# Patient Record
Sex: FEMALE | Race: WHITE | NOT HISPANIC OR LATINO | Employment: FULL TIME | URBAN - METROPOLITAN AREA
[De-identification: names, ages, dates, MRNs, and addresses within clinical notes are randomized per-mention and may not be internally consistent; named-entity substitution may affect disease eponyms.]

---

## 2014-12-31 LAB — HUMAN PAPILLOMAVIRUS (HPV): NORMAL

## 2018-08-17 LAB — HIV AG/AB 4TH GEN: NORMAL

## 2019-12-13 RX ORDER — BUPROPION HYDROCHLORIDE 150 MG/1
TABLET ORAL
Qty: 30 TABLET | OUTPATIENT
Start: 2019-12-13

## 2019-12-16 ENCOUNTER — OFFICE VISIT (OUTPATIENT)
Dept: FAMILY MEDICINE | Facility: CLINIC | Age: 54
End: 2019-12-16
Payer: COMMERCIAL

## 2019-12-16 VITALS
TEMPERATURE: 100 F | HEIGHT: 63 IN | HEART RATE: 79 BPM | SYSTOLIC BLOOD PRESSURE: 138 MMHG | WEIGHT: 177 LBS | BODY MASS INDEX: 31.36 KG/M2 | DIASTOLIC BLOOD PRESSURE: 88 MMHG

## 2019-12-16 DIAGNOSIS — R53.82 CHRONIC FATIGUE: ICD-10-CM

## 2019-12-16 DIAGNOSIS — E66.09 CLASS 1 OBESITY DUE TO EXCESS CALORIES WITH SERIOUS COMORBIDITY AND BODY MASS INDEX (BMI) OF 31.0 TO 31.9 IN ADULT: ICD-10-CM

## 2019-12-16 DIAGNOSIS — J30.1 SEASONAL ALLERGIC RHINITIS DUE TO POLLEN: ICD-10-CM

## 2019-12-16 DIAGNOSIS — F41.1 ANXIETY, GENERALIZED: ICD-10-CM

## 2019-12-16 DIAGNOSIS — I10 ESSENTIAL HYPERTENSION: ICD-10-CM

## 2019-12-16 DIAGNOSIS — F33.2 SEVERE EPISODE OF RECURRENT MAJOR DEPRESSIVE DISORDER, WITHOUT PSYCHOTIC FEATURES: Primary | ICD-10-CM

## 2019-12-16 DIAGNOSIS — N95.1 PERIMENOPAUSAL: ICD-10-CM

## 2019-12-16 DIAGNOSIS — H60.311 ACUTE DIFFUSE OTITIS EXTERNA OF RIGHT EAR: ICD-10-CM

## 2019-12-16 PROBLEM — F33.9 EPISODE OF RECURRENT MAJOR DEPRESSIVE DISORDER: Chronic | Status: ACTIVE | Noted: 2019-12-16

## 2019-12-16 PROBLEM — F33.9 EPISODE OF RECURRENT MAJOR DEPRESSIVE DISORDER: Status: ACTIVE | Noted: 2019-12-16

## 2019-12-16 PROCEDURE — 99999 PR PBB SHADOW E&M-EST. PATIENT-LVL IV: CPT | Mod: PBBFAC,,, | Performed by: INTERNAL MEDICINE

## 2019-12-16 PROCEDURE — 96372 PR INJECTION,THERAP/PROPH/DIAG2ST, IM OR SUBCUT: ICD-10-PCS | Mod: S$GLB,,, | Performed by: INTERNAL MEDICINE

## 2019-12-16 PROCEDURE — 96372 THER/PROPH/DIAG INJ SC/IM: CPT | Mod: S$GLB,,, | Performed by: INTERNAL MEDICINE

## 2019-12-16 PROCEDURE — 99214 OFFICE O/P EST MOD 30 MIN: CPT | Mod: 25,S$GLB,, | Performed by: INTERNAL MEDICINE

## 2019-12-16 PROCEDURE — 99999 PR PBB SHADOW E&M-EST. PATIENT-LVL IV: ICD-10-PCS | Mod: PBBFAC,,, | Performed by: INTERNAL MEDICINE

## 2019-12-16 PROCEDURE — 99214 PR OFFICE/OUTPT VISIT, EST, LEVL IV, 30-39 MIN: ICD-10-PCS | Mod: 25,S$GLB,, | Performed by: INTERNAL MEDICINE

## 2019-12-16 RX ORDER — BUSPIRONE HYDROCHLORIDE 5 MG/1
5 TABLET ORAL SEE ADMIN INSTRUCTIONS
Qty: 60 TABLET | Refills: 0 | Status: SHIPPED | OUTPATIENT
Start: 2019-12-16 | End: 2020-12-15

## 2019-12-16 RX ORDER — METHYLPREDNISOLONE 4 MG/1
TABLET ORAL
Qty: 21 TABLET | Refills: 0 | Status: SHIPPED | OUTPATIENT
Start: 2019-12-17 | End: 2020-06-09

## 2019-12-16 RX ORDER — DESVENLAFAXINE SUCCINATE 50 MG/1
50 TABLET, EXTENDED RELEASE ORAL DAILY
Qty: 30 TABLET | Refills: 1 | Status: SHIPPED | OUTPATIENT
Start: 2019-12-16 | End: 2020-01-29

## 2019-12-16 RX ORDER — AMLODIPINE BESYLATE 5 MG/1
5 TABLET ORAL DAILY
COMMUNITY
End: 2020-01-06

## 2019-12-16 RX ORDER — BUPROPION HYDROCHLORIDE 150 MG/1
150 TABLET ORAL SEE ADMIN INSTRUCTIONS
COMMUNITY
End: 2020-09-08

## 2019-12-16 RX ORDER — METHYLPREDNISOLONE ACETATE 40 MG/ML
40 INJECTION, SUSPENSION INTRA-ARTICULAR; INTRALESIONAL; INTRAMUSCULAR; SOFT TISSUE
Status: COMPLETED | OUTPATIENT
Start: 2019-12-16 | End: 2019-12-16

## 2019-12-16 RX ORDER — HYDROCHLOROTHIAZIDE 25 MG/1
25 TABLET ORAL DAILY
COMMUNITY
End: 2020-05-19

## 2019-12-16 RX ADMIN — METHYLPREDNISOLONE ACETATE 40 MG: 40 INJECTION, SUSPENSION INTRA-ARTICULAR; INTRALESIONAL; INTRAMUSCULAR; SOFT TISSUE at 01:12

## 2019-12-16 NOTE — PATIENT INSTRUCTIONS
1. Get labs fasting  2. Start wellbutrin every other day for one week then stop  3. Start pristiq when wellbutrin ends.   4. Start buspar 5 mg this evening. Take for 5 days. If tolerating can increase to twice daily (only for anxiety)

## 2019-12-16 NOTE — PROGRESS NOTES
Subjective:      Patient ID: Marcos Patel is a 54 y.o. female.    Chief Complaint: Anxiety; Depression; and Allergies    HPI     54F here for follow up of depression, anxiety, and allergies. Last seen by me at [facility] on 10/7/19.    Anxiety, MDD  -10/7/19: PHQ2 score 3, PHQ9 score 9.  -Today: phq9 score 24  - lexapro 20 mg daily changed to wellbutrin  mg daily at last visit. Her father had recently passed away and she was having weight gain.   -prior treatments also included zoloft and celexa.   -she did well with the medication for about 1 month, however then began to have a lot of life stressors including unexpected medical expenses from her daughter injuring her knee during a soccer game and requiring MRI imaging and physical therapy, her daughter undergoing wisdom teeth extraction and tonsillectomy/adenoidectomy, and her ex- not pain child support or assisting with medical bills.  She is overwhelmed with becoming behind with her bills and stress in general.  Her  has been occurring with her over stressors as well.  She has had increased stress at work with the death of a co-worker and 1 of her close friends and coworkers being diagnosed with cancer.  She no longer feels that the medication is working and is having trouble focusing, feeling very fragile and foggy, stressed with constant clenching of her jaws and resulting stress headaches.  She feels emotional at all times and spread to thin.  She feels as though she is not able to complete tasks due to being unable to organize and prioritize.  She is still grieving over the death of her father and feeling overwhelmed.  Her energy level is very low.  No suicidal ideation.  We discussed weaning off the Wellbutrin and starting Pristiq as she feels as though she may be experiencing hormonal symptoms as well.  Support given.    Depression Patient Health Questionnaire 12/16/2019   Over the last two weeks how often have you been  bothered by little interest or pleasure in doing things 3   Over the last two weeks how often have you been bothered by feeling down, depressed or hopeless 3   PHQ-2 Total Score 6   Over the last two weeks how often have you been bothered by trouble falling or staying asleep, or sleeping too much 3   Over the last two weeks how often have you been bothered by feeling tired or having little energy 3   Over the last two weeks how often have you been bothered by a poor appetite or overeating 3   Over the last two weeks how often have you been bothered by feeling bad about yourself - or that you are a failure or have let yourself or your family down 3   Over the last two weeks how often have you been bothered by trouble concentrating on things, such as reading the newspaper or watching television 3   Over the last two weeks how often have you been bothered by moving or speaking so slowly that other people could have noticed. Or the opposite - being so fidgety or restless that you have been moving around a lot more than usual. 3   Over the last two weeks how often have you been bothered by thoughts that you would be better off dead, or of hurting yourself 0   If you checked off any problems, how difficult have these problems made it for you to do your work, take care of things at home or get along with other people? Extremely difficult   Total Score 24     Obesity:   -163 in 9/2018 --> 177 on 10/7 & today, 12/16/19  -not eating well. trying to drink more water. Knows that her diet could improve and is motivated to start eating better. She has not been going to the gym due to low mood.     HTN:  -compliant with amlodipine and hctz   -does not limit salt. BP controlled (elevated on first check, but resolved)  -does have trace BLE swelling     Right ear pain & allergies  -symptoms started 1.5 weeks ago with pain in her right ear that initially was piercing in nature.  She went to the walk-in clinic and was prescribed  Ciprodex drops, which she has not been as consistent with, but over the last few days the pain has spread bilaterally to an aching sensation with sinus pressure and congestion. She has not been taking anything over-the-counter.  She has not had any fevers or chills, but overall feels very fatigued and potentially low-grade fevers, but difficult to tell with her low mood.  No sick contacts.  Flu shot is up-to-date.  She is not a smoker.    We reviewed her needed laboratory data together.  She was given lab slips as she receives these at [facility].    Review of patient's allergies indicates:  No Known Allergies    Current Outpatient Medications:     amLODIPine (NORVASC) 5 MG tablet, Take 5 mg by mouth once daily., Disp: , Rfl:     buPROPion (WELLBUTRIN XL) 150 MG TB24 tablet, Take 150 mg by mouth As instructed. Every other day x 1 week then stop, Disp: , Rfl:     hydroCHLOROthiazide (HYDRODIURIL) 25 MG tablet, Take 25 mg by mouth once daily., Disp: , Rfl:     busPIRone (BUSPAR) 5 MG Tab, Take 1 tablet (5 mg total) by mouth As instructed. Take 1 pill in the evening for 5 days then can increase to twice daily if tolerating, Disp: 60 tablet, Rfl: 0    desvenlafaxine succinate (PRISTIQ) 50 MG Tb24, Take 1 tablet (50 mg total) by mouth once daily., Disp: 30 tablet, Rfl: 1    [START ON 12/17/2019] methylPREDNISolone (MEDROL DOSEPACK) 4 mg tablet, follow package directions, Disp: 21 tablet, Rfl: 0  No current facility-administered medications for this visit.     No past medical history on file.  No past surgical history on file.  No family history on file.  Social History     Socioeconomic History    Marital status:      Spouse name: Not on file    Number of children: Not on file    Years of education: Not on file    Highest education level: Not on file   Occupational History    Not on file   Social Needs    Financial resource strain: Not on file    Food insecurity:     Worry: Not on file     Inability:  Not on file    Transportation needs:     Medical: Not on file     Non-medical: Not on file   Tobacco Use    Smoking status: Not on file   Substance and Sexual Activity    Alcohol use: Not on file    Drug use: Not on file    Sexual activity: Not on file   Lifestyle    Physical activity:     Days per week: Not on file     Minutes per session: Not on file    Stress: Not on file   Relationships    Social connections:     Talks on phone: Not on file     Gets together: Not on file     Attends Buddhism service: Not on file     Active member of club or organization: Not on file     Attends meetings of clubs or organizations: Not on file     Relationship status: Not on file   Other Topics Concern    Not on file   Social History Narrative    Not on file      Review of Systems   Constitutional: Positive for activity change, fatigue and fever (low grade). Negative for chills.   HENT: Positive for ear pain, postnasal drip, rhinorrhea, sinus pressure and sinus pain. Negative for congestion, sneezing and sore throat.    Eyes: Positive for itching. Negative for visual disturbance.   Respiratory: Negative for cough, shortness of breath and wheezing.    Cardiovascular: Negative.  Negative for chest pain and palpitations.   Gastrointestinal: Negative for abdominal pain and nausea.   Endocrine: Negative for cold intolerance and heat intolerance.   Genitourinary: Negative.  Negative for dysuria.   Musculoskeletal: Negative for back pain and myalgias.   Skin: Negative.  Negative for rash.   Allergic/Immunologic: Positive for environmental allergies.   Neurological: Negative for dizziness, syncope and headaches.   Hematological: Positive for adenopathy. Does not bruise/bleed easily.   Psychiatric/Behavioral: Positive for decreased concentration, dysphoric mood and sleep disturbance. Negative for suicidal ideas. The patient is nervous/anxious.          Objective:     Body mass index is 31.35 kg/m².  /88   Pulse 79   Temp  "99.7 °F (37.6 °C) (Oral)   Ht 5' 3" (1.6 m)   Wt 80.3 kg (177 lb)   BMI 31.35 kg/m²       Physical Exam   Constitutional: She is oriented to person, place, and time. She appears well-developed and well-nourished. No distress.   HENT:   Head: Normocephalic.   Mouth/Throat: Oropharynx is clear and moist.   Bilateral TM without effusion or erythema. Right ear canal painful with otoscope insertion. Hearing intact bilaterally. Boggy nasal turbinates. Sinus tenderness diffusely.    Eyes: Conjunctivae are normal. Right eye exhibits no discharge. Left eye exhibits no discharge.   Cardiovascular: Normal rate, regular rhythm, normal heart sounds and intact distal pulses.   No murmur heard.  Pulmonary/Chest: Effort normal and breath sounds normal.   Abdominal: Soft. Bowel sounds are normal.   Musculoskeletal: She exhibits no tenderness.   Lymphadenopathy:     She has no cervical adenopathy.   Neurological: She is alert and oriented to person, place, and time. No sensory deficit.   Skin: Skin is warm and dry. Capillary refill takes 2 to 3 seconds. She is not diaphoretic.   Psychiatric:   Crying, depressed, anxious   Nursing note and vitals reviewed.      No results found for any previous visit.     No results found in the last 24 hours.     Labs, imaging, records reviewed in care everywhere.     Assessment:     Encounter Diagnoses   Name Primary?    Severe episode of recurrent major depressive disorder, without psychotic features Yes    Essential hypertension     Class 1 obesity due to excess calories with serious comorbidity and body mass index (BMI) of 31.0 to 31.9 in adult     Perimenopausal     Chronic fatigue     Acute diffuse otitis externa of right ear     Seasonal allergic rhinitis due to pollen     Anxiety, generalized         Plan:     #Essential HTN, controlled  ACC goal <130/80  -Continue home BP readings   -Continue HCTZ 25 mg daily & Norvasc 5 mg daily   -Counseled on low salt diet.  -Encouraged walking " 30 minutes a day   -6/3/19: microalbumin, tsh, cmp, mg: normal    #Anxiety, Depression, not at goal  -Previously on Zoloft and Celexa during divorce. Lexparo 20 mg 8/2018-8/2019. Changed to Welbutrin  mg daily  -Mood now exacerbated with life stressors and likely perimenopausal hormone shifts.   -Wean wellbutrin  mg to every other day for 1 week then change to pristiq (help with energy and vasomotor symptoms)  -Start buspar 5 mg nightly with increase to BID as tolerated for anxiety.  -rule out any metabolic derangements.   -No SI  -Support given   Depression Patient Health Questionnaire 12/16/2019   Over the last two weeks how often have you been bothered by little interest or pleasure in doing things 3   Over the last two weeks how often have you been bothered by feeling down, depressed or hopeless 3   PHQ-2 Total Score 6   Over the last two weeks how often have you been bothered by trouble falling or staying asleep, or sleeping too much 3   Over the last two weeks how often have you been bothered by feeling tired or having little energy 3   Over the last two weeks how often have you been bothered by a poor appetite or overeating 3   Over the last two weeks how often have you been bothered by feeling bad about yourself - or that you are a failure or have let yourself or your family down 3   Over the last two weeks how often have you been bothered by trouble concentrating on things, such as reading the newspaper or watching television 3   Over the last two weeks how often have you been bothered by moving or speaking so slowly that other people could have noticed. Or the opposite - being so fidgety or restless that you have been moving around a lot more than usual. 3   Over the last two weeks how often have you been bothered by thoughts that you would be better off dead, or of hurting yourself 0   If you checked off any problems, how difficult have these problems made it for you to do your work, take care  of things at home or get along with other people? Extremely difficult   Total Score 24     #Obesity- BMI 31.35  -8/2018: lipid panel: chol 157, hdl 53, ldl 88, tri 69, ha1c 5.1%  -Discussed weight loss and exercise in helping mood     #Perimenopausal   -follows with dr. faith  -6/3/19: vit d 29    #Right otitis externa & allergic rhinitis  -no s/s infection, but advised to continue ciprodex for right otitis externa  -depo medrol today. Start medrol dose pack tomorrow  -rest, hydration     #Preventative Care  -Mammogram 2/2019. BIRADS 1. Repeat in 1 year.   -HIV negative 8/2018  -Pap smear: 2019 with Dr. Faith. Get records.  -Colonoscopy 2012- Dr. Yang. Normal. Repeat in 10 years. Get records  -Influenza UTD (given at [facility])  -TDAP 6/31/19   -Shingrix rx given     Has [facility]. Follow up in 3-6 months. Labs ordered. Awaiting chart to merge with [facility].     Carmenza Jamil M.D.    Orders Placed This Encounter   Procedures    CBC auto differential    Comprehensive metabolic panel    TSH    Vitamin D    Lipid panel    Microalbumin/creatinine urine ratio    Magnesium    Iron and TIBC    Ferritin    Hemoglobin A1c      Medications Ordered This Encounter   Medications    busPIRone (BUSPAR) 5 MG Tab     Sig: Take 1 tablet (5 mg total) by mouth As instructed. Take 1 pill in the evening for 5 days then can increase to twice daily if tolerating     Dispense:  60 tablet     Refill:  0    desvenlafaxine succinate (PRISTIQ) 50 MG Tb24     Sig: Take 1 tablet (50 mg total) by mouth once daily.     Dispense:  30 tablet     Refill:  1    methylPREDNISolone (MEDROL DOSEPACK) 4 mg tablet     Sig: follow package directions     Dispense:  21 tablet     Refill:  0    methylPREDNISolone acetate injection 40 mg

## 2020-01-06 RX ORDER — AMLODIPINE BESYLATE 5 MG/1
TABLET ORAL
Qty: 90 TABLET | Refills: 0 | Status: SHIPPED | OUTPATIENT
Start: 2020-01-06 | End: 2020-05-19

## 2020-01-10 DIAGNOSIS — Z12.39 BREAST CANCER SCREENING: ICD-10-CM

## 2020-01-28 DIAGNOSIS — F41.1 ANXIETY, GENERALIZED: ICD-10-CM

## 2020-01-28 DIAGNOSIS — F33.2 SEVERE EPISODE OF RECURRENT MAJOR DEPRESSIVE DISORDER, WITHOUT PSYCHOTIC FEATURES: ICD-10-CM

## 2020-01-28 NOTE — TELEPHONE ENCOUNTER
Can you find out how she is doing with the pristiq? I don't think she has linked her mycharts yet.

## 2020-01-29 RX ORDER — DESVENLAFAXINE SUCCINATE 50 MG/1
TABLET, EXTENDED RELEASE ORAL
Qty: 90 TABLET | Refills: 0 | Status: SHIPPED | OUTPATIENT
Start: 2020-01-29 | End: 2020-06-09

## 2020-01-31 ENCOUNTER — TELEPHONE (OUTPATIENT)
Dept: FAMILY MEDICINE | Facility: CLINIC | Age: 55
End: 2020-01-31

## 2020-01-31 NOTE — TELEPHONE ENCOUNTER
Glomerular Filtration Rate  Order: [id number]  Status: Final result   Visible to patient: Yes ([place]) Dx: Essential hypertension, malignant; Sy...   Ref Range & Units 08:57 8mo ago   GFR Non  >59 mL/min >60 67 R   GFR  >59 mL/min >60 78 R   Comment:              STAGES OF CHRONIC KIDNEY DISEASE  STAGE          DESCRIPTION           GFR(mL/min/1.73 m2)  3         Moderate decrease GFR            30-59  4           Severe decrease GFR            15-29  5              Kidney Failure         <15 (or dialysis)  Chronic kidney disease is defined as either kidney damage  or GFR <60mL/min/1.73 m2 for >=3 months. Kidney damage is  defined as pathologic abnormalities or markers of damage,  including abnormalities in blood or urine tests or imaging  studies.   Resulting Agency  [employer]         Specimen Collected: 01/31/20 08:57 Last Resulted: 01/31/20 10:48           TSH  Order: [id number]  Status: Final result   Visible to patient: Yes ([place]) Dx: Essential hypertension, malignant; Sy...   Ref Range & Units 08:57 3yr ago   TSH 0.34 - 5.60 u[IU]/mL 1.35 1.08 R, CM   Resulting Agency  [employer] [facility]         Specimen Collected: 01/31/20 08:57 Last Resulted: 01/31/20 11:04           Microalbumin/Creatinine Ratio, Random Urine  Order: [id number]  Status: Final result   Visible to patient: Yes ([place]) Dx: Essential hypertension, malignant; Sy...   Ref Range & Units 08:40 8mo ago   Micoralbumin, Urine  see below 0.3 CM   Comment: MICROALBUMIN, RANDOM URINE   Creatinine, Urine mg/dL 46 160 R   Comment: No reference range established for this test.   Albumin/Creatinine Ratio, UR mg/g[creat] see below    Comment: UNABLE TO CALCULATE RESULT BECAUSE ONE OF THE REQUIRED TEST  RESULTS WAS OUTSIDE OF INSTRUMENT RANGE.  [employer] Definition of Microalbuminuria:  Category                  Spot Collection (mg/g  creatinine)  ------------------------------------------------------------  Normal                             <30  Microalbuminuria                     Macro (clinical)-albuminuria       > or = 300   Resulting Agency  [facility]         Specimen Collected: 01/31/20 08:40 Last Resulted: 01/31/20 09:32              Vitamin D, 25-Hydroxy Total  Order: [id number]  Status: Final result   Visible to patient: Yes ([place]) Dx: Essential hypertension, malignant; Sy...   Ref Range & Units 08:57 8mo ago   Vitamin D, 25-Hydroxy Total 30.0 - 100.0 ng/mL 49.9 29 R, CM   Comment: Vitamin D Status:  Deficient           <20 ng/mL  Insufficient        20-<30 ng/mL  Sufficient           ng/mL  Upper Safety limit  >100 ng/mL   Resulting Agency  [facility]         Specimen Collected: 01/31/20 08:57 Last Resulted: 01/31/20 11:04 Order Details View Encounter Lab and Collection Details Routing Result History      CM=Additional comments  R=Reference range differs from displayed range             Ferritin  Order: [id number]  Status: Final result   Visible to patient: Yes ([place]) Dx: Essential hypertension, malignant; Sy...   Ref Range & Units 08:57 8mo ago   Ferritin 11 - 307 ng/mL 12 10 R   Resulting [place]  [facility]         Specimen Collected: 01/31/20 08:57 Last Resulted: 01/31/20 11:04 Order Details View Encounter Lab and Collection Details Routing Result History      R=Reference range differs from displayed range            Magnesium  Order: [id number]  Status: Final result   Visible to patient: Yes ([place]) Dx: Essential hypertension, malignant; Sy...   Ref Range & Units 08:57 8mo ago   MG 1.8 - 2.5 mg/dL 2.1 2.0 R   Resulting Agency  [facility]         Specimen Collected: 01/31/20 08:57 Last Resulted: 01/31/20 10:48 Order Details View Encounter Lab and Collection Details Routing Result History      R=Reference range differs from displayed range             Transferrin  Order:  [id number]  Status: Final result   [facility] Dx: Essential hypertension, malignant; Sy...   Ref Range & Units 08:57 3yr ago   Transferrin 192.0 - 382.0 mg/dL 354.5 433.8   Calculated TIBC 255 - 450 ug/dL 496 607   % Saturation % 18 9   Resulting [facility]         Specimen Collected: 01/31/20 08:57 Last Resulted: 01/31/20 10:48           Iron  Order: [id number]  Status: Final result   [facility] Dx: Essential hypertension, malignant; Sy...   Ref Range & Units 08:57 3yr ago   Iron 28.0 - 170.0 ug/dL 90.0 57.0   Resulting Agency  [facility]         Specimen Collected: 01/31/20 08:57 Last Resulted: 01/31/20 10:48           Comprehensive metabolic panel  Order: [id number]  Status: Final result   [facility] Dx: Essential hypertension, malignant; Sy...   Ref Range & Units 08:57 8mo ago   Glucose 65 - 99 mg/dL 87 87 CM   Sodium 136 - 144 mmol/L 136 137 R   Potassium 3.6 - 5.1 mmol/L 3.2 3.5 R   Chloride 101 - 111 mmol/L 98 101 R   CO2 22 - 32 mmol/L 29 28 R   BUN 8 - 20 mg/dL 10 19 R   Calcium 8.9 - 10.3 mg/dL 9.8 9.5 R   Creatinine 0.60 - 1.10 mg/dL 0.63 0.97 R, CM   Albumin 3.5 - 4.8 g/dL 4.1 4.3 R   Total Bilirubin 0.4 - 2.0 mg/dL 0.6    ALKP 28 - 116 U/L 42 48 R   Total Protein 6.1 - 7.9 g/dL 7.1 7.0 R   ALT 5 - 41 U/L 21 15 R   AST 10 - 34 U/L 23 15 R   Anion Gap 7 - 16 mmol/L 9    Resulting [facility]         Specimen Collected: 01/31/20 08:57 Last Resulted: 01/31/20 10:48 Order Details View Encounter Lab and Collection Details Routing Result History      CM=Additional comments  R=Reference range differs from displayed range               CBC with Differential  Order: [id number]  Status: Final result   Visible to patient: Yes (MyChart) Dx: Essential hypertension, malignant; Sy...   Ref Range & Units 08:57 8mo ago   WBC 4.4 - 11.2 10*3/uL 7.1 10.8 R   RBC 4.20 - 5.40 10*6/uL 4.68 4.73 R   HGB 12.0 - 16.0  g/dL 13.0 13.1 R   HCT 37.0 - 47.0 % 41.7 39.2 R   MCV 81.0 - 99.0 fL 89.1    MCH 27.0 - 31.0 pg 27.8    MCHC 33.0 - 37.0 g/dL 31.2 33.4 R   RDW 11.5 - 14.5 % 13.2 13.1 R   Platelet Count 130 - 375 10*3/uL 284 270 R   MPV 8.7 - 13.0 fL 10.0 10.8 R   Neutrophils Percent 36.0 - 66.0 % 60.1 73.9 R   Lymphocytes Percent 21.0 - 50.0 % 30.6 16.9 R   Monocytes Percent 2.0 - 10.0 % 7.4 8.1 R   Eosinophils Percent 0.0 - 10.0 % 1.0 0.5 R   Basophils Percent 0.0 - 1.0 % 0.6 0.6 R   Immature Granulocyte % 0.0 - 0.4 % 0.3    Neutrophils Absolute 1.4 - 6.5 10*3/uL 4.3 7,981 R   Lymphocytes Absolute 1.2 - 3.4 10*3/uL 2.2 1,825 R   Monocytes Absolute 0.1 - 1.0 10*3/uL 0.5 875 R   Eosinophils Absolute 0.0 - 0.7 10*3/uL 0.1 54 R   Basophils Absolute 0.0 - 0.2 10*3/uL 0.0 65 R   # Immature Granulocyte 0.00 - 0.03 10*3/uL 0.02    Resulting Agency  [facility]         Specimen Collected: 01/31/20 08:57 Last Resulted: 01/31/20 10:18 Order Details View Encounter Lab and Collection Details Routing Result History      R=Reference range differs from displayed range             Lipid panel  Order: [id number]  Status: Final result   Visible to patient: Yes (MyChart) Dx: Essential hypertension, malignant; Sy...   Ref Range & Units 08:57 1yr ago   Cholesterol 0 - 199 mg/dL 185 157 R   Triglyceride 0 - 199 mg/dL 99 69 R   HDL 29 - 89 mg/dL 59 53 R   LDL 0 - 109 mg/dL 106    Chol/HDL Ratio 0.0 - 4.5 3.1 3.0 R   Resulting Agency  [facility]         Specimen Collected: 01/31/20 08:57 Last Resulted: 01/31/20 10:48           Hemoglobin A1c  Order: [id number]  Status: Final result   Visible to patient: Yes (MyChart) Dx: Essential hypertension, malignant; Sy...   Ref Range & Units 08:57 1yr ago   Hemoglobin A1C 4.6 - 6.2 % 5.6 5.1 R, CM   Resulting Agency  [facility]         Specimen Collected: 01/31/20 08:57 Last Resulted: 01/31/20 11:13

## 2020-01-31 NOTE — TELEPHONE ENCOUNTER
----- Message from Carmenza Jamil MD sent at 1/31/2020  4:27 PM CST -----  Regarding: labs  Please pull labs from [facility] (not through care everywhere)

## 2020-02-03 ENCOUNTER — TELEPHONE (OUTPATIENT)
Dept: FAMILY MEDICINE | Facility: CLINIC | Age: 55
End: 2020-02-03

## 2020-02-03 PROBLEM — E87.6 HYPOKALEMIA: Status: ACTIVE | Noted: 2020-02-03

## 2020-02-03 NOTE — TELEPHONE ENCOUNTER
----- Message from Pura Gutierrez sent at 2/3/2020  1:16 PM CST -----  Contact: patient  Type:  Patient Returning Call    Who Called:patient  Who Left Message for Patient:nurse  Does the patient know what this is regarding?:poss test results  Would the patient rather a call back or a response via [facility]? call  Best Call Back Number:[phone]  Additional Information: please return call

## 2020-02-03 NOTE — TELEPHONE ENCOUNTER
Potassium is a little low. Increase K in the diet. Vitamin D has increased to normal levels.     Labs otherwise look great!

## 2020-02-10 ENCOUNTER — TELEPHONE (OUTPATIENT)
Dept: FAMILY MEDICINE | Facility: CLINIC | Age: 55
End: 2020-02-10

## 2020-02-10 NOTE — TELEPHONE ENCOUNTER
----- Message from Jonatan Kingston sent at 2/10/2020  9:19 AM CST -----  Contact: pt   .Type:  Patient Returning Call    Who Called: pt   Who Left Message for Patient: nurse   Does the patient know what this is regarding?: yes   Would the patient rather a call back or a response via [facility]? Callback   Best Call Back Number: [phone]  Additional Information:

## 2020-05-19 RX ORDER — HYDROCHLOROTHIAZIDE 25 MG/1
TABLET ORAL
Qty: 90 TABLET | Refills: 1 | Status: SHIPPED | OUTPATIENT
Start: 2020-05-19

## 2020-05-19 RX ORDER — AMLODIPINE BESYLATE 5 MG/1
TABLET ORAL
Qty: 90 TABLET | Refills: 0 | Status: SHIPPED | OUTPATIENT
Start: 2020-05-19 | End: 2020-09-08

## 2020-06-10 ENCOUNTER — PATIENT OUTREACH (OUTPATIENT)
Dept: ADMINISTRATIVE | Facility: HOSPITAL | Age: 55
End: 2020-06-10

## 2020-06-10 NOTE — LETTER
Erum 10, 2020    Wyatt Heredia MD             [facility]  [address]  [facility] [address]  Phone: [phone] Erum 10, 2020     Patient: Marcos Patel    YOB: 1965   Date of Visit: 6/10/2020       To whom it may concern       We are seeing Marcos CLEMENTS Jorge, YOB: 1965, at [facility]. Carmenza Jamil MD is their primary care physician. To help with our [place] maintenance records could you please send the following:     Recent Papsmear Result/HPV     Please send fax to [phone], Attention Dana SWAN LPN Care Coordination.    Thank-you in advance for your assistance. If you have any questions or concerns, please don't hesitate to contact me at [phone].    Dana SWAN LPN  Care Coordination Department  [facility]  Phone number [phone]

## 2020-06-10 NOTE — PROGRESS NOTES
Labs manually entered from outside lab HIV Screening 2018 & uploaded colonoscopy.   Fax form sent for Pap Result.

## 2020-09-08 DIAGNOSIS — I10 ESSENTIAL HYPERTENSION: Primary | Chronic | ICD-10-CM

## 2020-09-08 RX ORDER — AMLODIPINE BESYLATE 5 MG/1
5 TABLET ORAL DAILY
Qty: 90 TABLET | Refills: 0 | Status: SHIPPED | OUTPATIENT
Start: 2020-09-08

## 2020-10-12 DIAGNOSIS — F33.2 SEVERE EPISODE OF RECURRENT MAJOR DEPRESSIVE DISORDER, WITHOUT PSYCHOTIC FEATURES: ICD-10-CM

## 2020-10-12 DIAGNOSIS — F41.1 ANXIETY, GENERALIZED: ICD-10-CM

## 2020-10-13 RX ORDER — DESVENLAFAXINE SUCCINATE 50 MG/1
50 TABLET, EXTENDED RELEASE ORAL DAILY
Qty: 30 TABLET | Refills: 0 | Status: SHIPPED | OUTPATIENT
Start: 2020-10-13 | End: 2020-11-23

## 2020-10-13 NOTE — TELEPHONE ENCOUNTER
Pt is still taking the medication, states she did miss several doses because she went out of town and forgot her meds